# Patient Record
Sex: MALE | Race: WHITE | NOT HISPANIC OR LATINO | Employment: FULL TIME | URBAN - METROPOLITAN AREA
[De-identification: names, ages, dates, MRNs, and addresses within clinical notes are randomized per-mention and may not be internally consistent; named-entity substitution may affect disease eponyms.]

---

## 2022-10-11 DIAGNOSIS — M25.561 PAIN IN BOTH KNEES, UNSPECIFIED CHRONICITY: Primary | ICD-10-CM

## 2022-10-11 DIAGNOSIS — M25.562 PAIN IN BOTH KNEES, UNSPECIFIED CHRONICITY: Primary | ICD-10-CM

## 2022-10-12 ENCOUNTER — HOSPITAL ENCOUNTER (OUTPATIENT)
Dept: RADIOLOGY | Facility: HOSPITAL | Age: 57
Discharge: HOME | End: 2022-10-12
Attending: ORTHOPAEDIC SURGERY
Payer: COMMERCIAL

## 2022-10-12 ENCOUNTER — OFFICE VISIT (OUTPATIENT)
Dept: ORTHOPEDICS | Facility: CLINIC | Age: 57
End: 2022-10-12
Payer: COMMERCIAL

## 2022-10-12 DIAGNOSIS — M25.562 PAIN IN BOTH KNEES, UNSPECIFIED CHRONICITY: ICD-10-CM

## 2022-10-12 DIAGNOSIS — M25.561 PAIN IN BOTH KNEES, UNSPECIFIED CHRONICITY: ICD-10-CM

## 2022-10-12 DIAGNOSIS — M17.12 PRIMARY OSTEOARTHRITIS OF LEFT KNEE: Primary | ICD-10-CM

## 2022-10-12 DIAGNOSIS — M17.11 PRIMARY OSTEOARTHRITIS OF RIGHT KNEE: ICD-10-CM

## 2022-10-12 PROCEDURE — 1159F MED LIST DOCD IN RCRD: CPT | Mod: CPTII,S$GLB,, | Performed by: ORTHOPAEDIC SURGERY

## 2022-10-12 PROCEDURE — 73564 X-RAY EXAM KNEE 4 OR MORE: CPT | Mod: TC,50,FY

## 2022-10-12 PROCEDURE — 99203 PR OFFICE/OUTPT VISIT, NEW, LEVL III, 30-44 MIN: ICD-10-PCS | Mod: 25,S$GLB,, | Performed by: ORTHOPAEDIC SURGERY

## 2022-10-12 PROCEDURE — 99999 PR PBB SHADOW E&M-EST. PATIENT-LVL II: ICD-10-PCS | Mod: PBBFAC,,, | Performed by: ORTHOPAEDIC SURGERY

## 2022-10-12 PROCEDURE — 1159F PR MEDICATION LIST DOCUMENTED IN MEDICAL RECORD: ICD-10-PCS | Mod: CPTII,S$GLB,, | Performed by: ORTHOPAEDIC SURGERY

## 2022-10-12 PROCEDURE — 4010F PR ACE/ARB THEARPY RXD/TAKEN: ICD-10-PCS | Mod: CPTII,S$GLB,, | Performed by: ORTHOPAEDIC SURGERY

## 2022-10-12 PROCEDURE — 20610 DRAIN/INJ JOINT/BURSA W/O US: CPT | Mod: 50,BC50,S$GLB, | Performed by: ORTHOPAEDIC SURGERY

## 2022-10-12 PROCEDURE — 1160F PR REVIEW ALL MEDS BY PRESCRIBER/CLIN PHARMACIST DOCUMENTED: ICD-10-PCS | Mod: CPTII,S$GLB,, | Performed by: ORTHOPAEDIC SURGERY

## 2022-10-12 PROCEDURE — 99999 PR PBB SHADOW E&M-EST. PATIENT-LVL II: CPT | Mod: PBBFAC,,, | Performed by: ORTHOPAEDIC SURGERY

## 2022-10-12 PROCEDURE — 99203 OFFICE O/P NEW LOW 30 MIN: CPT | Mod: 25,S$GLB,, | Performed by: ORTHOPAEDIC SURGERY

## 2022-10-12 PROCEDURE — 20610 PR DRAIN/INJECT LARGE JOINT/BURSA: ICD-10-PCS | Mod: ,,, | Performed by: ORTHOPAEDIC SURGERY

## 2022-10-12 PROCEDURE — 1160F RVW MEDS BY RX/DR IN RCRD: CPT | Mod: CPTII,S$GLB,, | Performed by: ORTHOPAEDIC SURGERY

## 2022-10-12 PROCEDURE — 20610 DRAIN/INJ JOINT/BURSA W/O US: CPT | Mod: ,,, | Performed by: ORTHOPAEDIC SURGERY

## 2022-10-12 PROCEDURE — 4010F ACE/ARB THERAPY RXD/TAKEN: CPT | Mod: CPTII,S$GLB,, | Performed by: ORTHOPAEDIC SURGERY

## 2022-10-12 PROCEDURE — 73564 XR KNEE ORTHO BILAT WITH FLEXION: ICD-10-PCS | Mod: 26,,, | Performed by: RADIOLOGY

## 2022-10-12 PROCEDURE — 73564 X-RAY EXAM KNEE 4 OR MORE: CPT | Mod: 26,,, | Performed by: RADIOLOGY

## 2022-10-12 RX ORDER — TRIAMCINOLONE ACETONIDE 40 MG/ML
40 INJECTION, SUSPENSION INTRA-ARTICULAR; INTRAMUSCULAR
Status: DISCONTINUED | OUTPATIENT
Start: 2022-10-12 | End: 2022-10-12 | Stop reason: HOSPADM

## 2022-10-12 RX ADMIN — TRIAMCINOLONE ACETONIDE 40 MG: 40 INJECTION, SUSPENSION INTRA-ARTICULAR; INTRAMUSCULAR at 09:10

## 2022-10-12 NOTE — PROCEDURES
Large Joint Aspiration/Injection: bilateral knee    Date/Time: 10/12/2022 9:00 AM  Performed by: Joaquin Peñaloza MD  Authorized by: Joaquin Peñaloza MD     Consent Done?:  Yes (Verbal)  Indications:  Pain  Site marked: the procedure site was marked    Timeout: prior to procedure the correct patient, procedure, and site was verified    Prep: patient was prepped and draped in usual sterile fashion      Local anesthesia used?: Yes    Anesthesia:  Local infiltration  Local anesthetic:  Bupivacaine 0.25% without epinephrine and lidocaine 2% without epinephrine  Anesthetic total (ml):  6      Details:  Needle Size:  22 G  Ultrasonic Guidance for needle placement?: No    Approach:  Anterolateral  Location:  Knee  Laterality:  Bilateral  Site:  Bilateral knee  Medications (Right):  40 mg triamcinolone acetonide 40 mg/mL  Medications (Left):  40 mg triamcinolone acetonide 40 mg/mL  Patient tolerance:  Patient tolerated the procedure well with no immediate complications

## 2022-10-12 NOTE — PROGRESS NOTES
Patient ID: Joseph Baca is a 57 y.o. male    No chief complaint on file.      History of Present Illness:    Joseph Baca presents to clinic for bilateral knee pain, L> R. Patient denies known DAR. The pain started years ago, but recently he has a pain shooting from his knee into his ankle.  Pain is located over (points to) medial joint line bilateral knees. He reports that the pain is a 8 /10 sore and aching pain toda. The pain is affecting ADLs and limiting desired level of activity. Denies numbness, tingling, radiation and inability to bear weight.    He is on mobic which reports some relief. He reports gel injections in his knees about 1 year ago at [facility] which did not help his pain. He is interested in CSI today.    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  PAST SURGICAL HISTORY: No past surgical history on file.  FAMILY HISTORY: No family history on file.  SOCIAL HISTORY:   Social History     Occupational History    Not on file   Tobacco Use    Smoking status: Not on file    Smokeless tobacco: Not on file   Substance and Sexual Activity    Alcohol use: Not on file    Drug use: Not on file    Sexual activity: Not on file        MEDICATIONS: No current outpatient medications on file.  ALLERGIES: Review of patient's allergies indicates:  Not on File      Physical Exam     There were no vitals filed for this visit.  Alert and oriented to person, place and time. No acute distress. Well-groomed, not ill appearing. Pupils round and reactive, normal respiratory effort, no audible wheezing.     GENERAL:  A well-developed, well-nourished 57 y.o. male who is alert and       oriented in no acute distress.      Gait: He  walks with a normal gait.                   EXTREMITIES:  Examination of lower extremities reveals there is no visible mass or  deformity.    Left knee:  ROM 5-110 pain at 0    Ligamentously stable to varus/valgus stress.    Anterior and posterior drawers negative.    No pain over pes bursa.    No warmth    No erythema     Effusion No    medial joint line tenderness    Positive Patellofemoral grind/crepitus     Varus     Right knee:  ROM 0-120    Ligamentously stable to varus/valgus stress.    Anterior and posterior drawers negative.    No pain over pes bursa.    No warmth    No erythema    Effusion No    medial joint line tenderness    Positive Patellofemoral grind/crepitus     The skin over both lower extremities is normal and unremarkable.  He has a  painless range of motion of the hips and ankles bilaterally.   Sensation is intact in both lower extremities.    There are no motor deficits in the lower extremities bilaterally.   Pedal pulses are palpable distally bilaterally.    He has no calf tenderness to palpation nor edema.    Imaging:     B/L knee ortho X-rays ordered/reviewed by me showing no evidence of fracture or dislocation. There is no obvious malalignment. No evidence of masses, lesions or foreign bodies.       Assessment & Plan    Primary osteoarthritis of left knee    Primary osteoarthritis of right knee       I made the decision to obtain old records of the patient including previous notes and imaging. New imaging was ordered today of the extremity or extremities evaluated. I independently reviewed and interpreted the radiographs and/or MRIs/CT scan today as well as prior imaging.    We discussed at length different treatment options including conservative vs surgical management. These include anti-inflammatories, acetaminophen, rest, ice, heat, formal physical therapy including strengthening and stretching exercises, home exercise programs, injections, dry needling, and finally surgical intervention.      Patient would like to proceed with a trial of bilateral CSI, anti-inflammatories, bracing, and HEP.     Bilateral knee  CSI given  Continue Mobic and tylenol as needed. Instructed to not take other NSAIDs with Mobic, but may take Tylenol if needed.   Ice compress to the affected area 2-3x a day for 15-20 minutes as needed for pain management  Recommended PT, patient declined at this time.   HEP lower extremity strengthening given  Encouraged weight loss    Follow up: 3 months   X-rays next visit: none    All questions were answered and patient is agreeable to the above plan.

## 2023-01-13 ENCOUNTER — OFFICE VISIT (OUTPATIENT)
Dept: ORTHOPEDICS | Facility: CLINIC | Age: 58
End: 2023-01-13
Payer: COMMERCIAL

## 2023-01-13 VITALS — HEIGHT: 70 IN | BODY MASS INDEX: 40.09 KG/M2 | WEIGHT: 280 LBS

## 2023-01-13 DIAGNOSIS — M17.12 PRIMARY OSTEOARTHRITIS OF LEFT KNEE: Primary | ICD-10-CM

## 2023-01-13 DIAGNOSIS — M17.11 PRIMARY OSTEOARTHRITIS OF RIGHT KNEE: ICD-10-CM

## 2023-01-13 PROCEDURE — 3008F PR BODY MASS INDEX (BMI) DOCUMENTED: ICD-10-PCS | Mod: CPTII,S$GLB,, | Performed by: ORTHOPAEDIC SURGERY

## 2023-01-13 PROCEDURE — 99999 PR PBB SHADOW E&M-EST. PATIENT-LVL III: CPT | Mod: PBBFAC,,, | Performed by: ORTHOPAEDIC SURGERY

## 2023-01-13 PROCEDURE — 99213 PR OFFICE/OUTPT VISIT, EST, LEVL III, 20-29 MIN: ICD-10-PCS | Mod: 25,S$GLB,, | Performed by: ORTHOPAEDIC SURGERY

## 2023-01-13 PROCEDURE — 20610 LARGE JOINT ASPIRATION/INJECTION: BILATERAL KNEE: ICD-10-PCS | Mod: 50,S$GLB,, | Performed by: ORTHOPAEDIC SURGERY

## 2023-01-13 PROCEDURE — 3008F BODY MASS INDEX DOCD: CPT | Mod: CPTII,S$GLB,, | Performed by: ORTHOPAEDIC SURGERY

## 2023-01-13 PROCEDURE — 20610 DRAIN/INJ JOINT/BURSA W/O US: CPT | Mod: 50,S$GLB,, | Performed by: ORTHOPAEDIC SURGERY

## 2023-01-13 PROCEDURE — 1159F MED LIST DOCD IN RCRD: CPT | Mod: CPTII,S$GLB,, | Performed by: ORTHOPAEDIC SURGERY

## 2023-01-13 PROCEDURE — 99213 OFFICE O/P EST LOW 20 MIN: CPT | Mod: 25,S$GLB,, | Performed by: ORTHOPAEDIC SURGERY

## 2023-01-13 PROCEDURE — 99999 PR PBB SHADOW E&M-EST. PATIENT-LVL III: ICD-10-PCS | Mod: PBBFAC,,, | Performed by: ORTHOPAEDIC SURGERY

## 2023-01-13 PROCEDURE — 1159F PR MEDICATION LIST DOCUMENTED IN MEDICAL RECORD: ICD-10-PCS | Mod: CPTII,S$GLB,, | Performed by: ORTHOPAEDIC SURGERY

## 2023-01-13 RX ORDER — MELOXICAM 15 MG/1
15 TABLET ORAL
COMMUNITY
Start: 2022-12-28 | End: 2023-06-27

## 2023-01-13 RX ORDER — CELECOXIB 200 MG/1
CAPSULE ORAL
COMMUNITY
End: 2023-06-27

## 2023-01-13 RX ORDER — LOSARTAN POTASSIUM 50 MG/1
50 TABLET ORAL
COMMUNITY
Start: 2022-08-01

## 2023-01-13 RX ORDER — METHYLPREDNISOLONE 4 MG/1
TABLET ORAL
COMMUNITY
Start: 2022-08-01 | End: 2023-06-27

## 2023-01-13 RX ORDER — TRIAMCINOLONE ACETONIDE 40 MG/ML
40 INJECTION, SUSPENSION INTRA-ARTICULAR; INTRAMUSCULAR
Status: DISCONTINUED | OUTPATIENT
Start: 2023-01-13 | End: 2023-01-13 | Stop reason: HOSPADM

## 2023-01-13 RX ORDER — DICLOFENAC SODIUM 75 MG/1
TABLET, DELAYED RELEASE ORAL
COMMUNITY

## 2023-01-13 RX ORDER — DOXYCYCLINE 100 MG/1
CAPSULE ORAL
COMMUNITY
End: 2023-06-27

## 2023-01-13 RX ORDER — CEFUROXIME AXETIL 500 MG/1
TABLET ORAL
COMMUNITY
Start: 2022-08-01 | End: 2023-06-27

## 2023-01-13 RX ORDER — OSELTAMIVIR PHOSPHATE 75 MG/1
CAPSULE ORAL
COMMUNITY
Start: 2022-09-15 | End: 2023-06-27

## 2023-01-13 RX ADMIN — TRIAMCINOLONE ACETONIDE 40 MG: 40 INJECTION, SUSPENSION INTRA-ARTICULAR; INTRAMUSCULAR at 08:01

## 2023-01-13 NOTE — PROGRESS NOTES
Patient ID: Joseph Baca is a 58 y.o. male    Knee Pain        History of Present Illness:    Joseph Baca presents to clinic for bilateral knee pain, L> R. Patient denies known DAR. The pain started years ago, but recently he has a pain shooting from his knee into his ankle.  Pain is located over (points to) medial joint line bilateral knees. He reports that the pain is a 8 /10 sore and aching pain toda. The pain is affecting ADLs and limiting desired level of activity. Denies numbness, tingling, radiation and inability to bear weight.    He is on mobic which reports some relief. He reports gel injections in his knees about 1 year ago at [facility] which did not help his pain. He is interested in CSI today.    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  Pain 2/10 today.  Left knee continues to be the problem.  He states that the injections helped for about 2 months.  Inquiring about repeat injections today.    PAST MEDICAL HISTORY: History reviewed. No pertinent past medical history.  PAST SURGICAL HISTORY: History reviewed. No pertinent surgical history.  FAMILY HISTORY: History reviewed. No pertinent family history.  SOCIAL HISTORY:   Social History     Occupational History    Not on file   Tobacco Use    Smoking status: Not on file    Smokeless tobacco: Not on file   Substance and Sexual Activity    Alcohol use: Not on file    Drug use: Not on file    Sexual activity: Not on file        MEDICATIONS:   Current Outpatient Medications:     losartan (COZAAR) 50 MG tablet, Take 50 mg by mouth., Disp: , Rfl:     phenylephrine-DM-guaiFENesin 10-17.5-400 mg Tab, Take 1 tablet by mouth 3 (three) times daily., Disp: , Rfl:     cefUROXime  (CEFTIN) 500 MG tablet, , Disp: , Rfl:     celecoxib (CELEBREX) 200 MG capsule, celecoxib 200 mg capsule  TAKE ONE CAPSULE BY MOUTH DAILY AS NEEDED, Disp: , Rfl:     diclofenac (VOLTAREN) 75 MG EC tablet, diclofenac sodium 75 mg tablet,delayed release, Disp: , Rfl:     doxycycline (VIBRAMYCIN) 100 MG Cap, doxycycline hyclate 100 mg capsule, Disp: , Rfl:     meloxicam (MOBIC) 15 MG tablet, Take 15 mg by mouth., Disp: , Rfl:     methylPREDNISolone (MEDROL DOSEPACK) 4 mg tablet, , Disp: , Rfl:     oseltamivir (TAMIFLU) 75 MG capsule, , Disp: , Rfl:   ALLERGIES:   Review of patient's allergies indicates:   Allergen Reactions    Ciprofloxacin Hives    Sulfa (sulfonamide antibiotics) Hives    Sulfamethoxazole-trimethoprim Hives         Physical Exam     There were no vitals filed for this visit.  Alert and oriented to person, place and time. No acute distress. Well-groomed, not ill appearing. Pupils round and reactive, normal respiratory effort, no audible wheezing.     GENERAL:  A well-developed, well-nourished 58 y.o. male who is alert and       oriented in no acute distress.      Gait: He  walks with a normal gait.                   EXTREMITIES:  Examination of lower extremities reveals there is no visible mass or deformity.    Left knee:  ROM 5-110 pain at 0    Ligamentously stable to varus/valgus stress.    Anterior and posterior drawers negative.    No pain over pes bursa.    No warmth    No erythema     Effusion No    medial joint line tenderness    Positive Patellofemoral grind/crepitus     Varus     Right knee:  ROM 0-120    Ligamentously stable to varus/valgus stress.    Anterior and posterior drawers negative.    No pain over pes bursa.    No warmth    No erythema    Effusion No    medial joint line tenderness    Positive Patellofemoral grind/crepitus     The skin over both lower extremities is normal and unremarkable.  He has a  painless range of motion of the hips and ankles bilaterally.   Sensation is intact  in both lower extremities.    There are no motor deficits in the lower extremities bilaterally.   Pedal pulses are palpable distally bilaterally.    He has no calf tenderness to palpation nor edema.    Imaging:     B/L knee ortho X-rays ordered/reviewed by me showing no evidence of fracture or dislocation. There is no obvious malalignment. No evidence of masses, lesions or foreign bodies.       Assessment & Plan    Primary osteoarthritis of left knee           I made the decision to obtain old records of the patient including previous notes and imaging. New imaging was ordered today of the extremity or extremities evaluated. I independently reviewed and interpreted the radiographs and/or MRIs/CT scan today as well as prior imaging.    We discussed at length different treatment options including conservative vs surgical management.  We did discuss total knee replacement and partial knee replacement.  I do not think he is a candidate for partial knee replacement due to patellofemoral disease.  We also discussed repeat injections.  He is more interested in staying conservative now if possible.  We will do bilateral knee steroid injections today.  I will see him back in 3 months.

## 2023-01-13 NOTE — PROCEDURES
Large Joint Aspiration/Injection: bilateral knee    Date/Time: 1/13/2023 8:15 AM  Performed by: Joaquin Peñaloza MD  Authorized by: Joaquin Peñaloza MD     Consent Done?:  Yes (Verbal)  Indications:  Pain and arthritis  Site marked: the procedure site was marked    Timeout: prior to procedure the correct patient, procedure, and site was verified    Prep: patient was prepped and draped in usual sterile fashion      Local anesthesia used?: Yes    Anesthesia:  Local infiltration  Local anesthetic:  Lidocaine 2% without epinephrine  Anesthetic total (ml):  6      Details:  Needle Size:  22 G  Ultrasonic Guidance for needle placement?: No    Approach:  Anterolateral  Location:  Knee  Laterality:  Bilateral  Site:  Bilateral knee  Medications (Right):  40 mg triamcinolone acetonide 40 mg/mL  Medications (Left):  40 mg triamcinolone acetonide 40 mg/mL  Patient tolerance:  Patient tolerated the procedure well with no immediate complications

## 2023-04-28 ENCOUNTER — OFFICE VISIT (OUTPATIENT)
Dept: ORTHOPEDICS | Facility: CLINIC | Age: 58
End: 2023-04-28
Payer: COMMERCIAL

## 2023-04-28 DIAGNOSIS — M17.11 PRIMARY OSTEOARTHRITIS OF RIGHT KNEE: ICD-10-CM

## 2023-04-28 DIAGNOSIS — M17.12 PRIMARY OSTEOARTHRITIS OF LEFT KNEE: Primary | ICD-10-CM

## 2023-04-28 PROCEDURE — 20610 LARGE JOINT ASPIRATION/INJECTION: BILATERAL KNEE: ICD-10-PCS | Mod: 50,BC50,S$GLB, | Performed by: ORTHOPAEDIC SURGERY

## 2023-04-28 PROCEDURE — 20610 DRAIN/INJ JOINT/BURSA W/O US: CPT | Mod: 50,BC50,S$GLB, | Performed by: ORTHOPAEDIC SURGERY

## 2023-04-28 PROCEDURE — 4010F PR ACE/ARB THEARPY RXD/TAKEN: ICD-10-PCS | Mod: CPTII,S$GLB,, | Performed by: ORTHOPAEDIC SURGERY

## 2023-04-28 PROCEDURE — 99499 UNLISTED E&M SERVICE: CPT | Mod: S$GLB,,, | Performed by: ORTHOPAEDIC SURGERY

## 2023-04-28 PROCEDURE — 99499 NO LOS: ICD-10-PCS | Mod: S$GLB,,, | Performed by: ORTHOPAEDIC SURGERY

## 2023-04-28 PROCEDURE — 4010F ACE/ARB THERAPY RXD/TAKEN: CPT | Mod: CPTII,S$GLB,, | Performed by: ORTHOPAEDIC SURGERY

## 2023-04-28 PROCEDURE — 20610 DRAIN/INJ JOINT/BURSA W/O US: CPT | Mod: S$GLB,,, | Performed by: ORTHOPAEDIC SURGERY

## 2023-04-28 RX ORDER — TRIAMCINOLONE ACETONIDE 40 MG/ML
40 INJECTION, SUSPENSION INTRA-ARTICULAR; INTRAMUSCULAR
Status: DISCONTINUED | OUTPATIENT
Start: 2023-04-28 | End: 2023-04-28 | Stop reason: HOSPADM

## 2023-04-28 RX ADMIN — TRIAMCINOLONE ACETONIDE 40 MG: 40 INJECTION, SUSPENSION INTRA-ARTICULAR; INTRAMUSCULAR at 08:04

## 2023-04-28 NOTE — PROGRESS NOTES
Patient is requesting bilateral knee injections today.  He had his last injections in January which helped.  Tolerated procedures well.  We will follow up in 3-4 months or earlier if necessary.

## 2023-04-28 NOTE — PROCEDURES
Large Joint Aspiration/Injection: bilateral knee    Date/Time: 4/28/2023 8:45 AM  Performed by: Joaquin Peñaloza MD  Authorized by: Joaquin Peñaloza MD     Consent Done?:  Yes (Verbal)  Indications:  Pain and arthritis  Site marked: the procedure site was marked    Timeout: prior to procedure the correct patient, procedure, and site was verified    Prep: patient was prepped and draped in usual sterile fashion      Local anesthesia used?: Yes    Anesthesia:  Local infiltration  Local anesthetic:  Lidocaine 1% without epinephrine (Ropivicaine)  Anesthetic total (ml):  6      Details:  Needle Size:  22 G  Ultrasonic Guidance for needle placement?: No    Approach:  Anterolateral  Location:  Knee  Laterality:  Bilateral  Site:  Bilateral knee  Medications (Right):  40 mg triamcinolone acetonide 40 mg/mL  Medications (Left):  40 mg triamcinolone acetonide 40 mg/mL  Patient tolerance:  Patient tolerated the procedure well with no immediate complications

## 2023-06-27 ENCOUNTER — OFFICE VISIT (OUTPATIENT)
Dept: PULMONOLOGY | Facility: CLINIC | Age: 58
End: 2023-06-27
Payer: COMMERCIAL

## 2023-06-27 ENCOUNTER — HOSPITAL ENCOUNTER (OUTPATIENT)
Dept: RADIOLOGY | Facility: HOSPITAL | Age: 58
Discharge: HOME | End: 2023-06-27
Attending: INTERNAL MEDICINE
Payer: COMMERCIAL

## 2023-06-27 VITALS
HEART RATE: 71 BPM | OXYGEN SATURATION: 95 % | DIASTOLIC BLOOD PRESSURE: 75 MMHG | HEIGHT: 70 IN | WEIGHT: 287.94 LBS | BODY MASS INDEX: 41.22 KG/M2 | SYSTOLIC BLOOD PRESSURE: 120 MMHG

## 2023-06-27 DIAGNOSIS — R06.02 SHORTNESS OF BREATH: ICD-10-CM

## 2023-06-27 DIAGNOSIS — G47.33 OSA ON CPAP: ICD-10-CM

## 2023-06-27 DIAGNOSIS — J45.40 MODERATE PERSISTENT ASTHMA WITHOUT COMPLICATION: Primary | ICD-10-CM

## 2023-06-27 DIAGNOSIS — E66.01 MORBID OBESITY: ICD-10-CM

## 2023-06-27 PROCEDURE — 99204 PR OFFICE/OUTPT VISIT, NEW, LEVL IV, 45-59 MIN: ICD-10-PCS | Mod: S$GLB,,, | Performed by: INTERNAL MEDICINE

## 2023-06-27 PROCEDURE — 4010F ACE/ARB THERAPY RXD/TAKEN: CPT | Mod: CPTII,S$GLB,, | Performed by: INTERNAL MEDICINE

## 2023-06-27 PROCEDURE — 71046 XR CHEST PA AND LATERAL: ICD-10-PCS | Mod: 26,,, | Performed by: RADIOLOGY

## 2023-06-27 PROCEDURE — 71046 X-RAY EXAM CHEST 2 VIEWS: CPT | Mod: TC,FY

## 2023-06-27 PROCEDURE — 3008F BODY MASS INDEX DOCD: CPT | Mod: CPTII,S$GLB,, | Performed by: INTERNAL MEDICINE

## 2023-06-27 PROCEDURE — 3078F DIAST BP <80 MM HG: CPT | Mod: CPTII,S$GLB,, | Performed by: INTERNAL MEDICINE

## 2023-06-27 PROCEDURE — 1159F PR MEDICATION LIST DOCUMENTED IN MEDICAL RECORD: ICD-10-PCS | Mod: CPTII,S$GLB,, | Performed by: INTERNAL MEDICINE

## 2023-06-27 PROCEDURE — 99204 OFFICE O/P NEW MOD 45 MIN: CPT | Mod: S$GLB,,, | Performed by: INTERNAL MEDICINE

## 2023-06-27 PROCEDURE — 99999 PR PBB SHADOW E&M-EST. PATIENT-LVL IV: ICD-10-PCS | Mod: PBBFAC,,, | Performed by: INTERNAL MEDICINE

## 2023-06-27 PROCEDURE — 4010F PR ACE/ARB THEARPY RXD/TAKEN: ICD-10-PCS | Mod: CPTII,S$GLB,, | Performed by: INTERNAL MEDICINE

## 2023-06-27 PROCEDURE — 1159F MED LIST DOCD IN RCRD: CPT | Mod: CPTII,S$GLB,, | Performed by: INTERNAL MEDICINE

## 2023-06-27 PROCEDURE — 99999 PR PBB SHADOW E&M-EST. PATIENT-LVL IV: CPT | Mod: PBBFAC,,, | Performed by: INTERNAL MEDICINE

## 2023-06-27 PROCEDURE — 3008F PR BODY MASS INDEX (BMI) DOCUMENTED: ICD-10-PCS | Mod: CPTII,S$GLB,, | Performed by: INTERNAL MEDICINE

## 2023-06-27 PROCEDURE — 3074F SYST BP LT 130 MM HG: CPT | Mod: CPTII,S$GLB,, | Performed by: INTERNAL MEDICINE

## 2023-06-27 PROCEDURE — 3074F PR MOST RECENT SYSTOLIC BLOOD PRESSURE < 130 MM HG: ICD-10-PCS | Mod: CPTII,S$GLB,, | Performed by: INTERNAL MEDICINE

## 2023-06-27 PROCEDURE — 3078F PR MOST RECENT DIASTOLIC BLOOD PRESSURE < 80 MM HG: ICD-10-PCS | Mod: CPTII,S$GLB,, | Performed by: INTERNAL MEDICINE

## 2023-06-27 PROCEDURE — 71046 X-RAY EXAM CHEST 2 VIEWS: CPT | Mod: 26,,, | Performed by: RADIOLOGY

## 2023-06-27 RX ORDER — ALBUTEROL SULFATE 90 UG/1
2 AEROSOL, METERED RESPIRATORY (INHALATION) EVERY 6 HOURS PRN
COMMUNITY
Start: 2023-06-13 | End: 2024-06-12

## 2023-06-27 RX ORDER — FLUTICASONE FUROATE AND VILANTEROL 200; 25 UG/1; UG/1
1 POWDER RESPIRATORY (INHALATION) DAILY
Qty: 60 EACH | Refills: 11 | Status: SHIPPED | OUTPATIENT
Start: 2023-06-27 | End: 2023-07-05

## 2023-06-27 NOTE — PATIENT INSTRUCTIONS
Start breo inhaler one puff daily- rinse mouth after use  Continue albuterol inhaler as needed  Pulmonary function tests, chest x-ray  Continue cpap nightly use- cpap supplies ordered  Request records from [facility] in [place]  Request records from [facility] including CT chest  Weight loss recommended

## 2023-06-27 NOTE — PROGRESS NOTES
6/27/2023    Joseph Baca  New Patient Consult    Chief Complaint   Patient presents with    Asthma       HPI:   06/27/2023    Pt is a 59 yo male with hx of NED, obesity, kidney stones presenting for new evaluation. His wife assists w/ history.  Tightness in his chest, breathing problems for 1 yr worse w/ bending over. Issues have persisted since he had COVID, flu last year. He has required antibiotic and steroid 3 different times for respiratory infection which helps but then symptoms come back.  He has cough, worse in am, productive clear phlegm. Has been using albuterol inhaler 3x/day which helps some. Symptoms assoc with chest pain and pressure.  He notices sensitivity to different smells, smoke, aerosol sprays  Has NED on CPAP nightly- about 6-7 yrs.  He went to the ED in [place] 2 wks ago due to breathing problems  He never smoked but was exposed to secondhand smoke as a child.  FH son has asthma  Pt works in sales, used to do plumbing and  in attics, may have occupational dust exposure.    The chief complaint problem is New to me    PFSH:  Past Medical History:   Diagnosis Date    Nephrolithiasis     NED on CPAP          History reviewed. No pertinent surgical history.  Social History     Tobacco Use    Smoking status: Never    Smokeless tobacco: Never     History reviewed. No pertinent family history.  Review of patient's allergies indicates:   Allergen Reactions    Ciprofloxacin Hives    Sulfa (sulfonamide antibiotics) Hives    Sulfamethoxazole-trimethoprim Hives       Performance Status:The patient's activity level is functions out of house.      Review of Systems:  a review of eleven systems covering constitutional, Eye, HEENT, Psych, Respiratory, Cardiac, GI, , Musculoskeletal, Endocrine, Dermatologic was negative except for pertinent findings as listed ABOVE and below:  All negative with pertinent positives as above        Exam:Comprehensive exam done. /75 (BP Location: Right arm,  "Patient Position: Sitting, BP Method: Large (Automatic))   Pulse 71   Ht 5' 9.5" (1.765 m)   Wt 130.6 kg (287 lb 14.7 oz)   SpO2 95%   BMI 41.91 kg/m²   Exam included Vitals as listed, and patient's appearance and affect and alertness and mood, oral exam for yeast and hygiene and pharynx lesions and Mallapatti (M) score, neck with inspection for jvd and masses and thyroid abnormalities and lymph nodes (supraclavicular and infraclavicular nodes and axillary also examined and noted if abn), chest exam included symmetry and effort and fremitus and percussion and auscultation, cardiac exam included rhythm and gallops and murmur and rubs and jvd and edema, abdominal exam for mass and hepatosplenomegaly and tenderness and hernias and bowel sounds, Musculoskeletal exam with muscle tone and posture and mobility/gait and  strength, and skin for rashes and cyanosis and pallor and turgor, extremity for clubbing.  Findings were normal except for pertinent findings listed below:  M4, oropharynx clear  HR regular  Breath sounds clear bilaterally  No edema/clubbing    Radiographs (ct chest and cxr) reviewed: results reviewed  Outside CXR 1/24/23-   FINDINGS: When allowing for differences in projection and technique, there is little change in the heart, lungs, pulmonary vascularity and regional skeleton. Lungs are clear.     IMPRESSION:    No active disease in the chest and no change       Labs reviewed    WBC 4.6 - 10.2 K/uL 8.4     RBC 4.69 - 6.13 M/uL 4.85     Hemoglobin 14.1 - 18.1 g/dl 15.3     Hematocrit 43.5 - 53.7 % 45.9     MCV 80 - 97 fL 95     MCH 27 - 31.2 pg 31.5 High      MCHC 31.8 - 35.4 g/dl 33.2     RDW 11.6 - 14.5 % 14.3     Platelets 142 - 424 K/uL 243     MPV 7.4 - 10.4 fL 5.6 Low      Lymphocytes Relative 10 - 50 % 13.0     Lymphocytes Absolute 0.6 - 4.1 K/uL 1.0     Monocytes Relative 0.1 - 24 % 3.9     Monocytes Absolute 0 - 1.8 K/uL 0.3     Granulocytes Relative 37 - 80 % 83.1 High      Granulocytes " Absolute 2 - 7.8 K/uL 7.1       PFT will be done and results to be reviewed      Plan:  Clinical impression is reasonably certain and repeated evaluation prn +/- follow up will be needed as below. Suspect asthma w/ recurrent exacerbations, establishing care    Problem List Items Addressed This Visit          Pulmonary    Moderate persistent asthma without complication - Primary    Relevant Medications    fluticasone furoate-vilanteroL (BREO ELLIPTA) 200-25 mcg/dose DsDv diskus inhaler    Other Relevant Orders    Complete PFT with bronchodilator       Other    NED on CPAP    Relevant Orders    CPAP/BIPAP SUPPLIES     Other Visit Diagnoses       Shortness of breath        Relevant Orders    X-Ray Chest PA And Lateral (Completed)    Morbid obesity                Follow up in about 3 months (around 9/27/2023).    Discussed with patient above for education the following:      Patient Instructions   Start breo inhaler one puff daily- rinse mouth after use  Continue albuterol inhaler as needed  Pulmonary function tests, chest x-ray  Continue cpap nightly use- cpap supplies ordered  Request records from [facility] in [place]  Request records from [facility] including CT chest  Weight loss recommended

## 2023-06-28 ENCOUNTER — TELEPHONE (OUTPATIENT)
Dept: PULMONOLOGY | Facility: CLINIC | Age: 58
End: 2023-06-28
Payer: COMMERCIAL

## 2023-06-28 NOTE — TELEPHONE ENCOUNTER
Sp/w pt states Breo will be too expensive  after ins pays he will have to pay $175 and would like to know if there is another inhaler that can be sent in  advised would send msg to Dr Durham  pt v/u

## 2023-06-28 NOTE — TELEPHONE ENCOUNTER
----- Message from Christina Sandoval sent at 6/27/2023  4:37 PM CDT -----  Regarding: PA  Have you seen a PA for this?   ----- Message -----  From: Stephanie Montoya  Sent: 6/27/2023   4:15 PM CDT  To: [facility]    Type: Needs Medical Advice  Who Called:  Pt wife  Best Call Back Number: [phone]  Additional Information: wife states the pts insurance will not cover the full cost of the RX albuterol (PROVENTIL/VENTOLIN HFA) 90 mcg/actuation inhaler,  pt wants to know if they need a PA, pl call bk to advise thanks

## 2023-07-03 ENCOUNTER — TELEPHONE (OUTPATIENT)
Dept: PULMONOLOGY | Facility: CLINIC | Age: 58
End: 2023-07-03
Payer: COMMERCIAL

## 2023-07-03 DIAGNOSIS — J45.40 MODERATE PERSISTENT ASTHMA WITHOUT COMPLICATION: Primary | ICD-10-CM

## 2023-07-03 RX ORDER — FLUTICASONE PROPIONATE AND SALMETEROL XINAFOATE 230; 21 UG/1; UG/1
2 AEROSOL, METERED RESPIRATORY (INHALATION) 2 TIMES DAILY
Qty: 12 G | Refills: 11 | Status: SHIPPED | OUTPATIENT
Start: 2023-07-03 | End: 2023-07-05

## 2023-07-03 NOTE — TELEPHONE ENCOUNTER
----- Message from Abimbola Armijo sent at 7/3/2023  8:57 AM CDT -----  Contact: pt rickie carlton  .Type: Needs Medical Advice  Who Called:  pt rickie carlton  Best Call Back Number: pt rickie carlton [phone]  Additional Information: pt wife would like to speak to the office about breo machine prescribed on 6/28

## 2023-07-03 NOTE — TELEPHONE ENCOUNTER
I sent to message to Lisa Membreno NP and never got a response back on this medication change request because the Breo was too expensive.  Please Advise.    Pharmacy:  [facility] [place], [place]

## 2023-07-03 NOTE — TELEPHONE ENCOUNTER
Patient's wife called and advised that the Breo is too expensive $175.  Dr. Durham is out of the office.   They would like an alternative.  He still has some chest tightness.  Please advise.    Pharmacy:  [facility] [place], [place]

## 2023-07-05 ENCOUNTER — TELEPHONE (OUTPATIENT)
Dept: PULMONOLOGY | Facility: CLINIC | Age: 58
End: 2023-07-05
Payer: COMMERCIAL

## 2023-07-05 DIAGNOSIS — J45.40 MODERATE PERSISTENT ASTHMA WITHOUT COMPLICATION: Primary | ICD-10-CM

## 2023-07-05 NOTE — TELEPHONE ENCOUNTER
Spoke with wife. Advised of Trelegy coupon and sample.     ----- Message from Lisa Membreno NP sent at 7/5/2023 10:16 AM CDT -----  Regarding: RE: Inhaler  Contact: Saritha Washington  I will place Trelegy samples and a $0 Copay card at the  for this patient.   ----- Message -----  From: Christina Sandoval  Sent: 7/5/2023   9:48 AM CDT  To: Abimbola Durham MD  Subject: Inhaler                                          Patient needing a cheaper alternative to medication.       ----- Message -----  From: Catherine Cisneros  Sent: 7/5/2023   9:35 AM CDT  To: [facility] Staff    Type:  RX Refill Request    Who Called:   Saritha Washington  Refill or New Rx:  New    RX Name and Strength:  fluticasone furoate-vilanteroL (BREO ELLIPTA) 200-25 mcg/dose DsDv diskus inhaler    Preferred Pharmacy with phone number:      [facility] [place], [place] - [address] [facility] [address] [place] [id number]  Phone: [phone] Fax: [phone]    Local or Mail Order:  Local  Ordering Provider:  Dr Durham    Would the patient rather a call back or a response via [facility]?   Call back  Best Call Back Number:  [phone] - Saritha Washington    Additional Information:   States they have checked the prices for inhaler and Advair and they are both almost $200 after insurance - states they need to try medication that will be more affordable after insurance - please call to advise/discuss - thank you

## 2023-07-07 ENCOUNTER — TELEPHONE (OUTPATIENT)
Dept: ORTHOPEDICS | Facility: CLINIC | Age: 58
End: 2023-07-07
Payer: COMMERCIAL

## 2023-07-12 ENCOUNTER — HOSPITAL ENCOUNTER (OUTPATIENT)
Dept: PULMONOLOGY | Facility: HOSPITAL | Age: 58
Discharge: HOME | End: 2023-07-12
Attending: INTERNAL MEDICINE
Payer: COMMERCIAL

## 2023-07-12 DIAGNOSIS — J45.40 MODERATE PERSISTENT ASTHMA WITHOUT COMPLICATION: ICD-10-CM

## 2023-07-12 LAB
BRPFT: ABNORMAL
DLCO SINGLE BREATH LLN: 21.98
DLCO SINGLE BREATH PRE REF: 74.5 %
DLCO SINGLE BREATH REF: 28.9
DLCOC SBVA LLN: 2.95
DLCOC SBVA REF: 4.17
DLCOC SINGLE BREATH LLN: 21.98
DLCOC SINGLE BREATH REF: 28.9
DLCOVA LLN: 2.95
DLCOVA PRE REF: 105.1 %
DLCOVA PRE: 4.39 ML/(MIN*MMHG*L) (ref 2.95–5.4)
DLCOVA REF: 4.17
ERV LLN: -16448.8
ERV PRE REF: 70 %
ERV REF: 1.2
FEF 25 75 CHG: 0.6 %
FEF 25 75 LLN: 1.55
FEF 25 75 POST REF: 98.8 %
FEF 25 75 PRE REF: 98.3 %
FEF 25 75 REF: 3.06
FET100 CHG: -13.9 %
FEV1 CHG: -1.2 %
FEV1 FVC CHG: 0.9 %
FEV1 FVC LLN: 66
FEV1 FVC POST REF: 104 %
FEV1 FVC PRE REF: 103.1 %
FEV1 FVC REF: 78
FEV1 LLN: 2.71
FEV1 POST REF: 83.4 %
FEV1 PRE REF: 84.4 %
FEV1 REF: 3.56
FRCPLETH LLN: 2.54
FRCPLETH PREREF: 66.9 %
FRCPLETH REF: 3.52
FVC CHG: -2 %
FVC LLN: 3.51
FVC POST REF: 80 %
FVC PRE REF: 81.6 %
FVC REF: 4.57
IVC PRE: 3.64 L (ref 3.51–5.65)
IVC SINGLE BREATH LLN: 3.51
IVC SINGLE BREATH PRE REF: 79.6 %
IVC SINGLE BREATH REF: 4.57
MVV LLN: 115
MVV PRE REF: 54.6 %
MVV REF: 136
PEF CHG: -2.4 %
PEF LLN: 6.96
PEF POST REF: 88 %
PEF PRE REF: 90.1 %
PEF REF: 9.21
POST FEF 25 75: 3.03 L/S (ref 1.55–5.1)
POST FET 100: 8.24 SEC
POST FEV1 FVC: 81.21 % (ref 66.35–88.22)
POST FEV1: 2.97 L (ref 2.71–4.37)
POST FVC: 3.66 L (ref 3.51–5.65)
POST PEF: 8.1 L/S (ref 6.96–11.46)
PRE DLCO: 21.53 ML/(MIN*MMHG) (ref 21.98–35.83)
PRE ERV: 0.84 L (ref -16448.8–16451.2)
PRE FEF 25 75: 3.01 L/S (ref 1.55–5.1)
PRE FET 100: 9.57 SEC
PRE FEV1 FVC: 80.49 % (ref 66.35–88.22)
PRE FEV1: 3.01 L (ref 2.71–4.37)
PRE FRC PL: 2.36 L (ref 2.54–4.51)
PRE FVC: 3.73 L (ref 3.51–5.65)
PRE MVV: 74.1 L/MIN (ref 115.37–156.09)
PRE PEF: 8.3 L/S (ref 6.96–11.46)
PRE RV: 1.51 L (ref 1.65–2.99)
PRE TLC: 5.25 L (ref 5.77–8.07)
RAW LLN: 3.06
RAW PRE REF: 102 %
RAW PRE: 3.12 CMH2O*S/L (ref 3.06–3.06)
RAW REF: 3.06
RV LLN: 1.65
RV PRE REF: 65.2 %
RV REF: 2.32
RVTLC LLN: 27
RVTLC PRE REF: 79.7 %
RVTLC PRE: 28.83 % (ref 27.21–45.17)
RVTLC REF: 36
TLC LLN: 5.77
TLC PRE REF: 75.8 %
TLC REF: 6.92
VA PRE: 4.91 L (ref 6.77–6.77)
VA SINGLE BREATH LLN: 6.77
VA SINGLE BREATH PRE REF: 72.5 %
VA SINGLE BREATH REF: 6.77
VC LLN: 3.51
VC PRE REF: 81.6 %
VC PRE: 3.73 L (ref 3.51–5.65)
VC REF: 4.57

## 2023-07-12 PROCEDURE — 94060 PR EVAL OF BRONCHOSPASM: ICD-10-PCS | Mod: 26,,, | Performed by: INTERNAL MEDICINE

## 2023-07-12 PROCEDURE — 94060 EVALUATION OF WHEEZING: CPT

## 2023-07-12 PROCEDURE — 94726 PLETHYSMOGRAPHY LUNG VOLUMES: CPT | Mod: 26,,, | Performed by: INTERNAL MEDICINE

## 2023-07-12 PROCEDURE — 94060 EVALUATION OF WHEEZING: CPT | Mod: 26,,, | Performed by: INTERNAL MEDICINE

## 2023-07-12 PROCEDURE — 94729 DIFFUSING CAPACITY: CPT | Mod: 26,,, | Performed by: INTERNAL MEDICINE

## 2023-07-12 PROCEDURE — 94726 PULM FUNCT TST PLETHYSMOGRAP: ICD-10-PCS | Mod: 26,,, | Performed by: INTERNAL MEDICINE

## 2023-07-12 PROCEDURE — 94726 PLETHYSMOGRAPHY LUNG VOLUMES: CPT

## 2023-07-12 PROCEDURE — 94729 PR C02/MEMBANE DIFFUSE CAPACITY: ICD-10-PCS | Mod: 26,,, | Performed by: INTERNAL MEDICINE

## 2023-07-12 PROCEDURE — 94729 DIFFUSING CAPACITY: CPT

## 2023-07-12 RX ORDER — ALBUTEROL SULFATE 2.5 MG/.5ML
2.5 SOLUTION RESPIRATORY (INHALATION)
Status: COMPLETED | OUTPATIENT
Start: 2023-07-12 | End: 2023-07-12

## 2023-07-12 RX ORDER — ALBUTEROL SULFATE 2.5 MG/.5ML
SOLUTION RESPIRATORY (INHALATION)
Status: DISPENSED
Start: 2023-07-12 | End: 2023-07-12

## 2023-07-12 RX ADMIN — ALBUTEROL SULFATE 2.5 MG: 2.5 SOLUTION RESPIRATORY (INHALATION) at 08:07

## 2023-07-14 NOTE — PROGRESS NOTES
Mr. Baca,  I reviewed your pulmonary function test. It shows mild restriction (low lung volumes) and otherwise good lung function. Continue plan discussed at last visit.  Dr. Durham

## 2023-09-15 ENCOUNTER — OFFICE VISIT (OUTPATIENT)
Dept: ORTHOPEDICS | Facility: CLINIC | Age: 58
End: 2023-09-15
Payer: COMMERCIAL

## 2023-09-15 VITALS — WEIGHT: 287 LBS | HEIGHT: 70 IN | BODY MASS INDEX: 41.09 KG/M2 | RESPIRATION RATE: 18 BRPM

## 2023-09-15 DIAGNOSIS — M17.12 PRIMARY OSTEOARTHRITIS OF LEFT KNEE: Primary | ICD-10-CM

## 2023-09-15 DIAGNOSIS — M17.11 PRIMARY OSTEOARTHRITIS OF RIGHT KNEE: ICD-10-CM

## 2023-09-15 PROCEDURE — 99999 PR PBB SHADOW E&M-EST. PATIENT-LVL III: CPT | Mod: PBBFAC,,, | Performed by: ORTHOPAEDIC SURGERY

## 2023-09-15 PROCEDURE — 20610 DRAIN/INJ JOINT/BURSA W/O US: CPT | Mod: S$GLB,,, | Performed by: ORTHOPAEDIC SURGERY

## 2023-09-15 PROCEDURE — 99499 UNLISTED E&M SERVICE: CPT | Mod: S$GLB,,, | Performed by: ORTHOPAEDIC SURGERY

## 2023-09-15 PROCEDURE — 20610 DRAIN/INJ JOINT/BURSA W/O US: CPT | Mod: 50,BC50,S$GLB, | Performed by: ORTHOPAEDIC SURGERY

## 2023-09-15 PROCEDURE — 99999 PR PBB SHADOW E&M-EST. PATIENT-LVL III: ICD-10-PCS | Mod: PBBFAC,,, | Performed by: ORTHOPAEDIC SURGERY

## 2023-09-15 PROCEDURE — 20610 PR DRAIN/INJECT LARGE JOINT/BURSA: ICD-10-PCS | Mod: S$GLB,,, | Performed by: ORTHOPAEDIC SURGERY

## 2023-09-15 PROCEDURE — 99499 NO LOS: ICD-10-PCS | Mod: S$GLB,,, | Performed by: ORTHOPAEDIC SURGERY

## 2023-09-15 RX ORDER — TRIAMCINOLONE ACETONIDE 40 MG/ML
40 INJECTION, SUSPENSION INTRA-ARTICULAR; INTRAMUSCULAR
Status: DISCONTINUED | OUTPATIENT
Start: 2023-09-15 | End: 2023-09-15 | Stop reason: HOSPADM

## 2023-09-15 RX ADMIN — TRIAMCINOLONE ACETONIDE 40 MG: 40 INJECTION, SUSPENSION INTRA-ARTICULAR; INTRAMUSCULAR at 08:09

## 2023-09-15 NOTE — PROGRESS NOTES
Patient here requesting bilateral knee injections.  Last injected in April for knee arthritis.  Tolerated well.  Follow up 3 months.

## 2023-09-15 NOTE — PROCEDURES
Large Joint Aspiration/Injection: bilateral knee    Date/Time: 9/15/2023 8:30 AM    Performed by: Joaquin Peñaloza MD  Authorized by: Joaquin Peñaloza MD    Consent Done?:  Yes (Verbal)  Indications:  Pain and arthritis  Site marked: the procedure site was marked    Timeout: prior to procedure the correct patient, procedure, and site was verified    Prep: patient was prepped and draped in usual sterile fashion      Local anesthesia used?: Yes    Anesthesia:  Local infiltration  Local anesthetic: Ropivicaine.  Anesthetic total (ml):  3      Details:  Needle Size:  22 G  Ultrasonic Guidance for needle placement?: No    Approach:  Anterolateral  Location:  Knee  Laterality:  Bilateral  Site:  Bilateral knee  Medications (Right):  40 mg triamcinolone acetonide 40 mg/mL  Medications (Left):  40 mg triamcinolone acetonide 40 mg/mL  Patient tolerance:  Patient tolerated the procedure well with no immediate complications

## 2023-09-27 ENCOUNTER — OFFICE VISIT (OUTPATIENT)
Dept: PULMONOLOGY | Facility: CLINIC | Age: 58
End: 2023-09-27
Payer: COMMERCIAL

## 2023-09-27 VITALS
HEART RATE: 64 BPM | OXYGEN SATURATION: 95 % | DIASTOLIC BLOOD PRESSURE: 77 MMHG | SYSTOLIC BLOOD PRESSURE: 123 MMHG | WEIGHT: 287.25 LBS | BODY MASS INDEX: 41.12 KG/M2 | HEIGHT: 70 IN

## 2023-09-27 DIAGNOSIS — R09.89 CHRONIC SINUS COMPLAINTS: ICD-10-CM

## 2023-09-27 DIAGNOSIS — J45.40 MODERATE PERSISTENT ASTHMA WITHOUT COMPLICATION: ICD-10-CM

## 2023-09-27 DIAGNOSIS — G47.33 OSA ON CPAP: Primary | ICD-10-CM

## 2023-09-27 DIAGNOSIS — R49.0 HOARSENESS: ICD-10-CM

## 2023-09-27 DIAGNOSIS — E66.01 MORBID OBESITY: ICD-10-CM

## 2023-09-27 PROCEDURE — 3074F PR MOST RECENT SYSTOLIC BLOOD PRESSURE < 130 MM HG: ICD-10-PCS | Mod: CPTII,S$GLB,, | Performed by: INTERNAL MEDICINE

## 2023-09-27 PROCEDURE — 4010F PR ACE/ARB THEARPY RXD/TAKEN: ICD-10-PCS | Mod: CPTII,S$GLB,, | Performed by: INTERNAL MEDICINE

## 2023-09-27 PROCEDURE — 4010F ACE/ARB THERAPY RXD/TAKEN: CPT | Mod: CPTII,S$GLB,, | Performed by: INTERNAL MEDICINE

## 2023-09-27 PROCEDURE — 3078F DIAST BP <80 MM HG: CPT | Mod: CPTII,S$GLB,, | Performed by: INTERNAL MEDICINE

## 2023-09-27 PROCEDURE — 3008F BODY MASS INDEX DOCD: CPT | Mod: CPTII,S$GLB,, | Performed by: INTERNAL MEDICINE

## 2023-09-27 PROCEDURE — 3078F PR MOST RECENT DIASTOLIC BLOOD PRESSURE < 80 MM HG: ICD-10-PCS | Mod: CPTII,S$GLB,, | Performed by: INTERNAL MEDICINE

## 2023-09-27 PROCEDURE — 1159F PR MEDICATION LIST DOCUMENTED IN MEDICAL RECORD: ICD-10-PCS | Mod: CPTII,S$GLB,, | Performed by: INTERNAL MEDICINE

## 2023-09-27 PROCEDURE — 99214 PR OFFICE/OUTPT VISIT, EST, LEVL IV, 30-39 MIN: ICD-10-PCS | Mod: S$GLB,,, | Performed by: INTERNAL MEDICINE

## 2023-09-27 PROCEDURE — 99999 PR PBB SHADOW E&M-EST. PATIENT-LVL III: ICD-10-PCS | Mod: PBBFAC,,, | Performed by: INTERNAL MEDICINE

## 2023-09-27 PROCEDURE — 3074F SYST BP LT 130 MM HG: CPT | Mod: CPTII,S$GLB,, | Performed by: INTERNAL MEDICINE

## 2023-09-27 PROCEDURE — 3008F PR BODY MASS INDEX (BMI) DOCUMENTED: ICD-10-PCS | Mod: CPTII,S$GLB,, | Performed by: INTERNAL MEDICINE

## 2023-09-27 PROCEDURE — 1159F MED LIST DOCD IN RCRD: CPT | Mod: CPTII,S$GLB,, | Performed by: INTERNAL MEDICINE

## 2023-09-27 PROCEDURE — 99999 PR PBB SHADOW E&M-EST. PATIENT-LVL III: CPT | Mod: PBBFAC,,, | Performed by: INTERNAL MEDICINE

## 2023-09-27 PROCEDURE — 99214 OFFICE O/P EST MOD 30 MIN: CPT | Mod: S$GLB,,, | Performed by: INTERNAL MEDICINE

## 2023-09-27 RX ORDER — LEVOCETIRIZINE DIHYDROCHLORIDE 5 MG/1
5 TABLET, FILM COATED ORAL NIGHTLY
Qty: 30 TABLET | Refills: 11 | Status: SHIPPED | OUTPATIENT
Start: 2023-09-27 | End: 2024-09-26

## 2023-09-27 RX ORDER — MONTELUKAST SODIUM 10 MG/1
10 TABLET ORAL NIGHTLY
Qty: 30 TABLET | Refills: 11 | Status: SHIPPED | OUTPATIENT
Start: 2023-09-27 | End: 2024-09-21

## 2023-09-27 RX ORDER — BUDESONIDE, GLYCOPYRROLATE, AND FORMOTEROL FUMARATE 160; 9; 4.8 UG/1; UG/1; UG/1
2 AEROSOL, METERED RESPIRATORY (INHALATION) 2 TIMES DAILY
Qty: 10.7 G | Refills: 11 | Status: SHIPPED | OUTPATIENT
Start: 2023-09-27

## 2023-09-27 NOTE — PROGRESS NOTES
9/27/2023    Joseph Baca  Follow up    Chief Complaint   Patient presents with    Asthma       HPI:   09/27/2023- breo was too expensive so he was called in trelegy w/ coupon, was able to get for free. Until about 1wk ago he was using trelegy daily. Has stopped for 1wk due to voice going away. He works as a salesman and  of [employer] so loss of voice is significant for him. For 1 wk has been ok, has needed albuterol 1 or 2 times in past wk.  He still has postnasal drip, cough in am, mucous- chronic 20 yrs.   Continues to use CPAP nightly, sometimes feels like not enough pressure from machine. He needs new mask and tubing. He has had machine 7 yrs, current mask at least 1 yr.  His weight is unchanged. He has been able to lose weight in past due to diet and admits he needs to try this again.    06/27/2023  Start breo inhaler one puff daily- rinse mouth after use  Continue albuterol inhaler as needed  Pulmonary function tests, chest x-ray  Continue cpap nightly use- cpap supplies ordered  Request records from [facility] in [place]  Request records from [facility] including [facility] chest  Weight loss recommended    Pt is a 57 yo male with hx of NED, obesity, kidney stones presenting for new evaluation. His wife assists w/ history.  Tightness in his chest, breathing problems for 1 yr worse w/ bending over. Issues have persisted since he had COVID, flu last year. He has required antibiotic and steroid 3 different times for respiratory infection which helps but then symptoms come back.  He has cough, worse in am, productive clear phlegm. Has been using albuterol inhaler 3x/day which helps some. Symptoms assoc with chest pain and pressure.  He notices sensitivity to different smells, smoke, aerosol sprays  Has NED on CPAP nightly- about 6-7 yrs.  He went to the ED in [place] 2 wks ago due to breathing problems  He never smoked but was exposed to secondhand smoke as a child.  FH son has asthma  Pt works in  "sales, used to do plumbing and  in [employer], may have occupational dust exposure.    The chief complaint problem is stable    PFSH:  Past Medical History:   Diagnosis Date    Nephrolithiasis     NED on CPAP          No past surgical history on file.  Social History     Tobacco Use    Smoking status: Never    Smokeless tobacco: Never     No family history on file.  Review of patient's allergies indicates:   Allergen Reactions    Ciprofloxacin Hives    Sulfa (sulfonamide antibiotics) Hives    Sulfamethoxazole-trimethoprim Hives       Performance Status:The patient's activity level is functions out of house.      Review of Systems:  a review of eleven systems covering constitutional, Eye, HEENT, Psych, Respiratory, Cardiac, GI, , Musculoskeletal, Endocrine, Dermatologic was negative except for pertinent findings as listed ABOVE and below:  All negative with pertinent positives as above        Exam:Comprehensive exam done. /77 (BP Location: Right arm, Patient Position: Sitting, BP Method: Large (Automatic))   Pulse 64   Ht 5' 9.5" (1.765 m)   Wt 130.3 kg (287 lb 4.2 oz)   SpO2 95%   BMI 41.81 kg/m²   Exam included Vitals as listed, and patient's appearance and affect and alertness and mood, oral exam for yeast and hygiene and pharynx lesions and Mallapatti (M) score, neck with inspection for jvd and masses and thyroid abnormalities and lymph nodes (supraclavicular and infraclavicular nodes and axillary also examined and noted if abn), chest exam included symmetry and effort and fremitus and percussion and auscultation, cardiac exam included rhythm and gallops and murmur and rubs and jvd and edema, abdominal exam for mass and hepatosplenomegaly and tenderness and hernias and bowel sounds, Musculoskeletal exam with muscle tone and posture and mobility/gait and  strength, and skin for rashes and cyanosis and pallor and turgor, extremity for clubbing.  Findings were normal except for pertinent " findings listed below:  M4, oropharynx clear  HR regular  Breath sounds clear bilaterally  No edema/clubbing    Radiographs (ct chest and cxr) reviewed: results reviewed  Outside CXR 1/24/23-   FINDINGS: When allowing for differences in projection and technique, there is little change in the heart, lungs, pulmonary vascularity and regional skeleton. Lungs are clear.     IMPRESSION:    No active disease in the chest and no change       Labs reviewed    WBC 4.6 - 10.2 K/uL 8.4     RBC 4.69 - 6.13 M/uL 4.85     Hemoglobin 14.1 - 18.1 g/dl 15.3     Hematocrit 43.5 - 53.7 % 45.9     MCV 80 - 97 fL 95     MCH 27 - 31.2 pg 31.5 High      MCHC 31.8 - 35.4 g/dl 33.2     RDW 11.6 - 14.5 % 14.3     Platelets 142 - 424 K/uL 243     MPV 7.4 - 10.4 fL 5.6 Low      Lymphocytes Relative 10 - 50 % 13.0     Lymphocytes Absolute 0.6 - 4.1 K/uL 1.0     Monocytes Relative 0.1 - 24 % 3.9     Monocytes Absolute 0 - 1.8 K/uL 0.3     Granulocytes Relative 37 - 80 % 83.1 High      Granulocytes Absolute 2 - 7.8 K/uL 7.1       PFT  reviewed  7/12/23- mild restriction, no obstruction, mild reduction in dlco corrects to normal      PSG 2018 split night study from outside records- total .6, min O2 sat 73%    Plan:  Clinical impression is reasonably certain and repeated evaluation prn +/- follow up will be needed as below.  asthma w/ recurrent exacerbations, NED on cpap    Problem List Items Addressed This Visit          Pulmonary    Moderate persistent asthma without complication    Relevant Medications    budesonide-glycopyr-formoterol (BREZTRI AEROSPHERE) 160-9-4.8 mcg/actuation HFAA       Other    NED on CPAP - Primary    Relevant Orders    CPAP/BIPAP SUPPLIES     Other Visit Diagnoses       Hoarseness        Chronic sinus complaints        Relevant Medications    montelukast (SINGULAIR) 10 mg tablet    levocetirizine (XYZAL) 5 MG tablet    Morbid obesity                  Follow up in about 6 months (around 3/27/2024).    Discussed with  patient above for education the following:      Patient Instructions   Continue CPAP nightly use- will obtain compliance report from machine  New CPAP mask and supplies ordered  Breztri inhaler 2 puffs twice daily, rinse mouth after use, use w/ spacer  Stop trelegy due to hoarseness of voice  Weight loss recommended  Singulair for asthma/allergies take one pill daily- black box warning depression/suicidality reviewed. If you have any concerning side effects call clinic and discontinue use.  Start xyzal 1 pill daily for postnasal drip, allergies

## 2023-09-27 NOTE — PATIENT INSTRUCTIONS
Continue CPAP nightly use- will obtain compliance report from machine  New CPAP mask and supplies ordered  Breztri inhaler 2 puffs twice daily, rinse mouth after use, use w/ spacer  Stop trelegy due to hoarseness of voice  Weight loss recommended  Singulair for asthma/allergies take one pill daily- black box warning depression/suicidality reviewed. If you have any concerning side effects call clinic and discontinue use.  Start xyzal 1 pill daily for postnasal drip, allergies

## 2023-10-05 ENCOUNTER — TELEPHONE (OUTPATIENT)
Dept: PULMONOLOGY | Facility: CLINIC | Age: 58
End: 2023-10-05
Payer: COMMERCIAL

## 2023-10-05 NOTE — TELEPHONE ENCOUNTER
Placed a call to the pt in regards to a call back. Pt notified that [facility] will reach out to her for the supplies.     ----- Message from Little Anderson sent at 10/5/2023  2:01 PM CDT -----  Contact: Pt Wife Saritha  Type:  Needs Medical Advice    Who Called:  Pt Wife,   Would the patient rather a call back or a response via [facility]?  Call   Best Call Back Number:  Pt wife Saritha, [phone]  Additional Information: Need to know what they have to do to get medical records from previous provider sent to the ofc in an effort to get medical supplies ordered...     Please call to advise... Thank you...

## 2023-10-31 ENCOUNTER — TELEPHONE (OUTPATIENT)
Dept: PULMONOLOGY | Facility: CLINIC | Age: 58
End: 2023-10-31
Payer: COMMERCIAL

## 2023-10-31 NOTE — TELEPHONE ENCOUNTER
Placed a call to the pt in regards to the CPAP machine. Pt needed the phone number to the [employer] company.

## 2023-10-31 NOTE — TELEPHONE ENCOUNTER
----- Message from Sara Giang sent at 10/31/2023  4:17 PM CDT -----  Regarding: CPAP Mask  Contact: pt  Type:  Needs Medical Advice    Who Called: pt  Would the patient rather a call back or a response via [facility]? Call back  Best Call Back Number: [phone]    Additional Information: sts they need the number to whee an order was placed for a CPAP mask--please advise

## 2023-11-08 ENCOUNTER — TELEPHONE (OUTPATIENT)
Dept: PULMONOLOGY | Facility: CLINIC | Age: 58
End: 2023-11-08
Payer: COMMERCIAL

## 2023-11-08 NOTE — TELEPHONE ENCOUNTER
----- Message from Paul Alfaro sent at 10/3/2023  4:12 PM CDT -----  Regarding: Cpap/Bipap Supplies  Contact: [phone]  Good afternoon! Order is pending a copy of patient's sleep study. Thanks! Jesse

## 2023-11-08 NOTE — TELEPHONE ENCOUNTER
Spoke to patient.  He reports sleep study was received by DME and he picked up his supplies yesterday.

## 2023-12-04 ENCOUNTER — TELEPHONE (OUTPATIENT)
Dept: PULMONOLOGY | Facility: CLINIC | Age: 58
End: 2023-12-04
Payer: COMMERCIAL

## 2023-12-04 NOTE — TELEPHONE ENCOUNTER
[facility] got sleep study on 11/8  ----- Message from Elidia Mitchell LPN sent at 11/3/2023 11:24 AM CDT -----  Regarding: FW: Cpap/Bipap Supplies  Contact: [phone]  Can you fax this Mckenna?  Thanks.      ----- Message -----  From: Paul Alfaro  Sent: 11/2/2023   8:05 AM CDT  To: Elidia Mitchell LPN  Subject: RE: Cpap/Bipap Supplies                          Good morning! No I am still waiting on it. Thanks!  ----- Message -----  From: Elidia Mitchell LPN  Sent: 10/30/2023  12:01 PM CDT  To: Paul Alfaro  Subject: RE: Cpap/Bipap Supplies                          Have you received this sleep study?  ----- Message -----  From: Paul Alfaro  Sent: 10/3/2023   4:13 PM CDT  To: [facility]  Subject: Cpap/Bipap Supplies                              Good afternoon! Order is pending a copy of patient's sleep study. Thanks! Jesse

## 2023-12-12 ENCOUNTER — OFFICE VISIT (OUTPATIENT)
Dept: ORTHOPEDICS | Facility: CLINIC | Age: 58
End: 2023-12-12
Payer: COMMERCIAL

## 2023-12-12 VITALS — WEIGHT: 287.25 LBS | BODY MASS INDEX: 41.12 KG/M2 | HEIGHT: 70 IN

## 2023-12-12 DIAGNOSIS — M17.12 PRIMARY OSTEOARTHRITIS OF LEFT KNEE: Primary | ICD-10-CM

## 2023-12-12 DIAGNOSIS — M17.11 PRIMARY OSTEOARTHRITIS OF RIGHT KNEE: ICD-10-CM

## 2023-12-12 PROCEDURE — 99499 NO LOS: ICD-10-PCS | Mod: S$GLB,,, | Performed by: ORTHOPAEDIC SURGERY

## 2023-12-12 PROCEDURE — 20610 LARGE JOINT ASPIRATION/INJECTION: BILATERAL KNEE: ICD-10-PCS | Mod: S$GLB,,, | Performed by: ORTHOPAEDIC SURGERY

## 2023-12-12 PROCEDURE — 99999 PR PBB SHADOW E&M-EST. PATIENT-LVL III: ICD-10-PCS | Mod: PBBFAC,,, | Performed by: ORTHOPAEDIC SURGERY

## 2023-12-12 PROCEDURE — 20610 DRAIN/INJ JOINT/BURSA W/O US: CPT | Mod: BC50,50,S$GLB, | Performed by: ORTHOPAEDIC SURGERY

## 2023-12-12 PROCEDURE — 99999 PR PBB SHADOW E&M-EST. PATIENT-LVL III: CPT | Mod: PBBFAC,,, | Performed by: ORTHOPAEDIC SURGERY

## 2023-12-12 PROCEDURE — 99499 UNLISTED E&M SERVICE: CPT | Mod: S$GLB,,, | Performed by: ORTHOPAEDIC SURGERY

## 2023-12-12 PROCEDURE — 20610 DRAIN/INJ JOINT/BURSA W/O US: CPT | Mod: S$GLB,,, | Performed by: ORTHOPAEDIC SURGERY

## 2023-12-12 RX ORDER — TRIAMCINOLONE ACETONIDE 40 MG/ML
40 INJECTION, SUSPENSION INTRA-ARTICULAR; INTRAMUSCULAR
Status: DISCONTINUED | OUTPATIENT
Start: 2023-12-12 | End: 2023-12-12 | Stop reason: HOSPADM

## 2023-12-12 RX ADMIN — TRIAMCINOLONE ACETONIDE 40 MG: 40 INJECTION, SUSPENSION INTRA-ARTICULAR; INTRAMUSCULAR at 09:12

## 2023-12-12 NOTE — PROCEDURES
Large Joint Aspiration/Injection: bilateral knee    Date/Time: 12/12/2023 9:00 AM    Performed by: Joaquin Peñaloza MD  Authorized by: Joaquin Peñaloza MD    Consent Done?:  Yes (Verbal)  Indications:  Pain and arthritis  Site marked: the procedure site was marked    Timeout: prior to procedure the correct patient, procedure, and site was verified    Prep: patient was prepped and draped in usual sterile fashion      Local anesthesia used?: Yes    Anesthesia:  Local infiltration  Local anesthetic:  Lidocaine 1% without epinephrine (Ropivicaine)  Anesthetic total (ml):  6      Details:  Needle Size:  22 G  Ultrasonic Guidance for needle placement?: No    Approach:  Anterolateral  Location:  Knee  Laterality:  Bilateral  Site:  Bilateral knee  Medications (Right):  40 mg triamcinolone acetonide 40 mg/mL  Medications (Left):  40 mg triamcinolone acetonide 40 mg/mL  Patient tolerance:  Patient tolerated the procedure well with no immediate complications

## 2024-03-15 ENCOUNTER — OFFICE VISIT (OUTPATIENT)
Dept: ORTHOPEDICS | Facility: CLINIC | Age: 59
End: 2024-03-15
Payer: COMMERCIAL

## 2024-03-15 VITALS — HEIGHT: 70 IN | BODY MASS INDEX: 41.12 KG/M2 | WEIGHT: 287.25 LBS

## 2024-03-15 DIAGNOSIS — M17.11 PRIMARY OSTEOARTHRITIS OF RIGHT KNEE: Primary | ICD-10-CM

## 2024-03-15 DIAGNOSIS — M17.12 PRIMARY OSTEOARTHRITIS OF LEFT KNEE: ICD-10-CM

## 2024-03-15 PROCEDURE — 99999 PR PBB SHADOW E&M-EST. PATIENT-LVL III: CPT | Mod: PBBFAC,,, | Performed by: ORTHOPAEDIC SURGERY

## 2024-03-15 PROCEDURE — 20610 DRAIN/INJ JOINT/BURSA W/O US: CPT | Mod: 50,BC50,S$GLB, | Performed by: ORTHOPAEDIC SURGERY

## 2024-03-15 PROCEDURE — 99499 UNLISTED E&M SERVICE: CPT | Mod: S$GLB,,, | Performed by: ORTHOPAEDIC SURGERY

## 2024-03-15 PROCEDURE — 20610 DRAIN/INJ JOINT/BURSA W/O US: CPT | Mod: S$GLB,,, | Performed by: ORTHOPAEDIC SURGERY

## 2024-03-15 RX ORDER — TRIAMCINOLONE ACETONIDE 40 MG/ML
40 INJECTION, SUSPENSION INTRA-ARTICULAR; INTRAMUSCULAR
Status: DISCONTINUED | OUTPATIENT
Start: 2024-03-15 | End: 2024-03-15 | Stop reason: HOSPADM

## 2024-03-15 RX ADMIN — TRIAMCINOLONE ACETONIDE 40 MG: 40 INJECTION, SUSPENSION INTRA-ARTICULAR; INTRAMUSCULAR at 08:03

## 2024-03-15 NOTE — PROCEDURES
Large Joint Aspiration/Injection: bilateral knee    Date/Time: 3/15/2024 8:30 AM    Performed by: Joaquin Peñaloza MD  Authorized by: Joaquin Peñaloza MD    Consent Done?:  Yes (Verbal)  Indications:  Pain and arthritis  Site marked: the procedure site was marked    Timeout: prior to procedure the correct patient, procedure, and site was verified    Prep: patient was prepped and draped in usual sterile fashion      Local anesthesia used?: Yes    Anesthesia:  Local infiltration  Local anesthetic: Ropivicaine.  Anesthetic total (ml):  6      Details:  Needle Size:  22 G  Ultrasonic Guidance for needle placement?: No    Approach:  Anterolateral  Location:  Knee  Laterality:  Bilateral  Site:  Bilateral knee  Medications (Right):  40 mg triamcinolone acetonide 40 mg/mL  Medications (Left):  40 mg triamcinolone acetonide 40 mg/mL  Patient tolerance:  Patient tolerated the procedure well with no immediate complications

## 2024-05-10 DIAGNOSIS — M25.569 KNEE PAIN, UNSPECIFIED CHRONICITY, UNSPECIFIED LATERALITY: Primary | ICD-10-CM

## 2025-08-24 ENCOUNTER — OFFICE VISIT (OUTPATIENT)
Dept: URGENT CARE | Facility: CLINIC | Age: 60
End: 2025-08-24
Payer: COMMERCIAL

## 2025-08-24 VITALS
WEIGHT: 287 LBS | TEMPERATURE: 98 F | HEIGHT: 70 IN | SYSTOLIC BLOOD PRESSURE: 154 MMHG | RESPIRATION RATE: 18 BRPM | DIASTOLIC BLOOD PRESSURE: 52 MMHG | HEART RATE: 67 BPM | OXYGEN SATURATION: 98 % | BODY MASS INDEX: 41.09 KG/M2

## 2025-08-24 DIAGNOSIS — R07.9 CHEST PAIN, UNSPECIFIED TYPE: Primary | ICD-10-CM

## 2025-08-24 DIAGNOSIS — R09.1 PLEURISY: ICD-10-CM

## 2025-08-24 DIAGNOSIS — M89.8X1 SHOULDER BLADE PAIN: ICD-10-CM

## 2025-08-24 PROCEDURE — 99204 OFFICE O/P NEW MOD 45 MIN: CPT | Mod: 25,S$GLB,, | Performed by: NURSE PRACTITIONER

## 2025-08-24 PROCEDURE — 93000 ELECTROCARDIOGRAM COMPLETE: CPT | Mod: S$GLB,,, | Performed by: NURSE PRACTITIONER

## 2025-08-24 RX ORDER — PREDNISONE 20 MG/1
20 TABLET ORAL 2 TIMES DAILY
Qty: 10 TABLET | Refills: 0 | Status: SHIPPED | OUTPATIENT
Start: 2025-08-24 | End: 2025-08-29

## 2025-08-24 RX ORDER — EZETIMIBE 10 MG/1
10 TABLET ORAL
COMMUNITY